# Patient Record
Sex: FEMALE | HISPANIC OR LATINO
[De-identification: names, ages, dates, MRNs, and addresses within clinical notes are randomized per-mention and may not be internally consistent; named-entity substitution may affect disease eponyms.]

---

## 2023-03-14 PROBLEM — Z00.00 ENCOUNTER FOR PREVENTIVE HEALTH EXAMINATION: Status: ACTIVE | Noted: 2023-03-14

## 2023-03-31 ENCOUNTER — APPOINTMENT (OUTPATIENT)
Dept: NEPHROLOGY | Facility: CLINIC | Age: 53
End: 2023-03-31
Payer: MEDICAID

## 2023-03-31 VITALS
SYSTOLIC BLOOD PRESSURE: 130 MMHG | WEIGHT: 148 LBS | OXYGEN SATURATION: 97 % | DIASTOLIC BLOOD PRESSURE: 74 MMHG | HEART RATE: 81 BPM | TEMPERATURE: 97.9 F

## 2023-03-31 DIAGNOSIS — R80.9 PROTEINURIA, UNSPECIFIED: ICD-10-CM

## 2023-03-31 DIAGNOSIS — I10 ESSENTIAL (PRIMARY) HYPERTENSION: ICD-10-CM

## 2023-03-31 DIAGNOSIS — Z84.1 FAMILY HISTORY OF DISORDERS OF KIDNEY AND URETER: ICD-10-CM

## 2023-03-31 PROCEDURE — 99205 OFFICE O/P NEW HI 60 MIN: CPT

## 2023-03-31 NOTE — HISTORY OF PRESENT ILLNESS
[FreeTextEntry1] : Ms Wayne presents for evaluation of recently detected proteinuria.\par Pt's daughter is present for the visit with limited command of English.\par Translation is provided by MA in office.\par \par Ms Wayne reports HTN x 7 years and on Losartan 50 mg without recent adjustments.\par Pt seems to feel that her BP was well controlled until a year ago.\par Reports recurrent episodes of sudden onset generalized malaise with palpitations and feelings of anxiety.\par When BP is checked with these episodes, has been elevated to 170 range.\par Reports good adherence to low salt diet.\par  \par Pt recently changed her care to Dr Lindsey and was noted with proteinuria.\par 24 hour urine was collected with ~ 1.5 g protein.\par \par TSH is WNL\par Renal sonogram is unremarkable.\par \par Pt feels anxious about any renal issues due to one brother with CKD awaiting tp.\par

## 2023-03-31 NOTE — PHYSICAL EXAM
[General Appearance - Alert] : alert [General Appearance - In No Acute Distress] : in no acute distress [Neck Appearance] : the appearance of the neck was normal [] : the neck was supple [Auscultation Breath Sounds / Voice Sounds] : lungs were clear to auscultation bilaterally [Heart Rate And Rhythm] : heart rate was normal and rhythm regular [Heart Sounds] : normal S1 and S2 [Edema] : there was no peripheral edema [Bowel Sounds] : normal bowel sounds [Abdomen Soft] : soft [Abdomen Tenderness] : non-tender

## 2023-03-31 NOTE — ASSESSMENT
[FreeTextEntry1] : 54 yo woman with HTN x 7 years noted for recent labile control without clear precipitating cause.\par Positive for non-nephrotic range proteinuria with unclear association with elevated BP.\par Pt denies any NSAIDS.\par \par --Proteinuria : non-nephrotic range.   No immediate plans for biopsy.  Will reassess after stabilization of BP control.\par --HTN : Labile control. : will review home bp readings. \par             Check labs for secondary HTN prior to follow up.\par --Advised for GYN evaluation for perimenopausal symptoms.

## 2023-05-31 ENCOUNTER — APPOINTMENT (OUTPATIENT)
Dept: NEPHROLOGY | Facility: CLINIC | Age: 53
End: 2023-05-31